# Patient Record
Sex: MALE | NOT HISPANIC OR LATINO | Employment: OTHER | URBAN - METROPOLITAN AREA
[De-identification: names, ages, dates, MRNs, and addresses within clinical notes are randomized per-mention and may not be internally consistent; named-entity substitution may affect disease eponyms.]

---

## 2023-09-19 PROBLEM — R20.2 TINGLING: Status: ACTIVE | Noted: 2023-09-19

## 2023-09-19 PROBLEM — I25.10 ARTERIOSCLEROSIS OF CORONARY ARTERY: Status: ACTIVE | Noted: 2023-09-19

## 2023-09-19 PROBLEM — E78.5 DYSLIPIDEMIA: Status: ACTIVE | Noted: 2023-09-19

## 2023-09-19 PROBLEM — I82.403: Status: ACTIVE | Noted: 2023-09-19

## 2023-09-19 PROBLEM — I26.99 PULMONARY EMBOLI (MULTI): Status: ACTIVE | Noted: 2023-09-19

## 2023-09-19 PROBLEM — R20.0 NUMBNESS: Status: ACTIVE | Noted: 2023-09-19

## 2023-09-19 RX ORDER — FINASTERIDE 5 MG/1
5 TABLET, FILM COATED ORAL DAILY
COMMUNITY
Start: 2022-05-14

## 2023-09-19 RX ORDER — ALLOPURINOL 300 MG/1
1 TABLET ORAL DAILY
COMMUNITY
Start: 2022-02-16

## 2023-09-19 RX ORDER — LEVOTHYROXINE SODIUM 175 UG/1
175 TABLET ORAL DAILY
COMMUNITY
Start: 2012-01-19

## 2023-09-19 RX ORDER — AMLODIPINE BESYLATE 5 MG/1
5 TABLET ORAL DAILY
COMMUNITY
Start: 2012-03-09

## 2023-09-19 RX ORDER — TAMSULOSIN HYDROCHLORIDE 0.4 MG/1
0.4 CAPSULE ORAL
COMMUNITY
Start: 2022-03-18

## 2023-09-19 RX ORDER — METFORMIN HYDROCHLORIDE 500 MG/1
1000 TABLET, EXTENDED RELEASE ORAL
COMMUNITY
Start: 2022-05-14

## 2023-09-19 RX ORDER — METOPROLOL TARTRATE 25 MG/1
25 TABLET, FILM COATED ORAL 2 TIMES DAILY
COMMUNITY

## 2023-09-19 RX ORDER — BLOOD SUGAR DIAGNOSTIC
STRIP MISCELLANEOUS
COMMUNITY
Start: 2021-11-10

## 2023-09-19 RX ORDER — OMEGA-3-ACID ETHYL ESTERS 1 G/1
1 CAPSULE, LIQUID FILLED ORAL DAILY
COMMUNITY
Start: 2021-05-22

## 2023-09-19 RX ORDER — GLIMEPIRIDE 4 MG/1
4 TABLET ORAL
COMMUNITY
Start: 2012-09-12

## 2023-11-29 ENCOUNTER — OFFICE VISIT (OUTPATIENT)
Dept: CARDIOLOGY | Facility: CLINIC | Age: 78
End: 2023-11-29
Payer: MEDICARE

## 2023-11-29 VITALS
BODY MASS INDEX: 33.47 KG/M2 | DIASTOLIC BLOOD PRESSURE: 79 MMHG | HEIGHT: 69 IN | SYSTOLIC BLOOD PRESSURE: 166 MMHG | HEART RATE: 77 BPM | WEIGHT: 226 LBS | RESPIRATION RATE: 16 BRPM

## 2023-11-29 DIAGNOSIS — Z86.718 HISTORY OF DEEP VEIN THROMBOSIS: ICD-10-CM

## 2023-11-29 DIAGNOSIS — M79.89 SWELLING OF LOWER EXTREMITY: ICD-10-CM

## 2023-11-29 DIAGNOSIS — I26.99 PULMONARY EMBOLISM, UNSPECIFIED CHRONICITY, UNSPECIFIED PULMONARY EMBOLISM TYPE, UNSPECIFIED WHETHER ACUTE COR PULMONALE PRESENT (MULTI): Primary | ICD-10-CM

## 2023-11-29 PROCEDURE — 1159F MED LIST DOCD IN RCRD: CPT | Performed by: INTERNAL MEDICINE

## 2023-11-29 PROCEDURE — 1036F TOBACCO NON-USER: CPT | Performed by: INTERNAL MEDICINE

## 2023-11-29 PROCEDURE — 99214 OFFICE O/P EST MOD 30 MIN: CPT | Performed by: INTERNAL MEDICINE

## 2023-11-29 PROCEDURE — 1126F AMNT PAIN NOTED NONE PRSNT: CPT | Performed by: INTERNAL MEDICINE

## 2023-11-29 PROCEDURE — 1160F RVW MEDS BY RX/DR IN RCRD: CPT | Performed by: INTERNAL MEDICINE

## 2023-11-29 RX ORDER — ASCORBIC ACID 500 MG
500 TABLET ORAL DAILY
COMMUNITY

## 2023-11-29 RX ORDER — ORAL SEMAGLUTIDE 3 MG/1
3 TABLET ORAL DAILY
COMMUNITY
Start: 2023-08-25 | End: 2023-11-29 | Stop reason: ALTCHOICE

## 2023-11-29 RX ORDER — VITAMIN E MIXED 400 UNIT
400 CAPSULE ORAL DAILY
COMMUNITY

## 2023-11-29 RX ORDER — MULTIVITAMIN/IRON/FOLIC ACID 18MG-0.4MG
1 TABLET ORAL DAILY
COMMUNITY

## 2023-11-29 RX ORDER — ATORVASTATIN CALCIUM 40 MG/1
40 TABLET, FILM COATED ORAL DAILY
COMMUNITY

## 2023-11-29 RX ORDER — ACETAMINOPHEN 500 MG
500 TABLET ORAL EVERY 6 HOURS PRN
COMMUNITY

## 2023-11-29 RX ORDER — TIZANIDINE 2 MG/1
2 TABLET ORAL EVERY 8 HOURS PRN
COMMUNITY

## 2023-11-29 RX ORDER — UBIDECARENONE 30 MG
30 CAPSULE ORAL DAILY
COMMUNITY

## 2023-11-29 RX ORDER — GEMFIBROZIL 600 MG/1
600 TABLET, FILM COATED ORAL 2 TIMES DAILY
COMMUNITY

## 2023-11-29 RX ORDER — ORAL SEMAGLUTIDE 7 MG/1
1 TABLET ORAL DAILY
COMMUNITY

## 2023-11-29 ASSESSMENT — COLUMBIA-SUICIDE SEVERITY RATING SCALE - C-SSRS
2. HAVE YOU ACTUALLY HAD ANY THOUGHTS OF KILLING YOURSELF?: NO
6. HAVE YOU EVER DONE ANYTHING, STARTED TO DO ANYTHING, OR PREPARED TO DO ANYTHING TO END YOUR LIFE?: NO
1. IN THE PAST MONTH, HAVE YOU WISHED YOU WERE DEAD OR WISHED YOU COULD GO TO SLEEP AND NOT WAKE UP?: NO

## 2023-11-29 ASSESSMENT — PATIENT HEALTH QUESTIONNAIRE - PHQ9
SUM OF ALL RESPONSES TO PHQ9 QUESTIONS 1 AND 2: 0
1. LITTLE INTEREST OR PLEASURE IN DOING THINGS: NOT AT ALL
2. FEELING DOWN, DEPRESSED OR HOPELESS: NOT AT ALL

## 2023-11-29 ASSESSMENT — PAIN SCALES - GENERAL: PAINLEVEL: 0-NO PAIN

## 2023-11-29 NOTE — PATIENT INSTRUCTIONS
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 Given his age, male gender, magnitude of the event and low risk trigger - anticipate secondary prophylaxis as long as the perceived benefits outweigh the risks. Use the med-alert - please order another if preferred.     Discussed adding tubigrip to the current stocking. Elevate at night.     Follow up 10 months.

## 2023-11-29 NOTE — PROGRESS NOTES
OUTPATIENT FOLLOW-UP -  VASCULAR MEDICINE    DOS: 11/29/23  Last seen:    7/25/23    REQUESTING PHYSICIAN:  Dr Charly Christopher D.O.     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 Reports doing well on the eliquis. No bleeding on the Ac. Had a med-alert but lost it - replaced today. Uses compression for quinton LE swelling ?15-20 mmHg - these do not feel very strong. Discussed adding tubigrip today - reports sometimes adds ACE to help with the swelling. Has quinton AFO and this is also likely contributing to the edema.     From prev notes:    AdmittedApril 10-14th 2022 due to submassive PE with RV strain and bilateral DVTs currently on eliquis 5mg BID. He initially presented to the ED with dizziness and an episode of LOC. Pt improved on heparin (transitioned to eliquis) without any other interventions. THE ONLY IDENTIFIED TRIGGER FOR THE VTE WAS A TRIP [place] OVER A 2 WEEK PERIOD.      May 12 2022 echo: EF 55-60%. RV RECOVERED.         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 Given his age, male gender, magnitude of the event and low risk trigger - anticipate secondary prophylaxis as long as the perceived benefits outweigh the risks. Use the  med-alert - please order another if preferred.     Discussed adding tubigrip to the current stocking. Elevate at night.     Follow up 10 months.

## 2025-02-05 ENCOUNTER — OFFICE VISIT (OUTPATIENT)
Dept: CARDIOLOGY | Facility: CLINIC | Age: 80
End: 2025-02-05
Payer: MEDICARE

## 2025-02-05 VITALS
BODY MASS INDEX: 33.46 KG/M2 | DIASTOLIC BLOOD PRESSURE: 93 MMHG | OXYGEN SATURATION: 94 % | HEART RATE: 79 BPM | HEIGHT: 70 IN | WEIGHT: 233.7 LBS | RESPIRATION RATE: 16 BRPM | SYSTOLIC BLOOD PRESSURE: 157 MMHG

## 2025-02-05 DIAGNOSIS — I26.99 PULMONARY EMBOLISM, UNSPECIFIED CHRONICITY, UNSPECIFIED PULMONARY EMBOLISM TYPE, UNSPECIFIED WHETHER ACUTE COR PULMONALE PRESENT (MULTI): Primary | ICD-10-CM

## 2025-02-05 DIAGNOSIS — Z86.718 HISTORY OF DEEP VEIN THROMBOSIS: ICD-10-CM

## 2025-02-05 PROCEDURE — 1159F MED LIST DOCD IN RCRD: CPT | Performed by: INTERNAL MEDICINE

## 2025-02-05 PROCEDURE — 99214 OFFICE O/P EST MOD 30 MIN: CPT | Performed by: INTERNAL MEDICINE

## 2025-02-05 PROCEDURE — 1160F RVW MEDS BY RX/DR IN RCRD: CPT | Performed by: INTERNAL MEDICINE

## 2025-02-05 ASSESSMENT — COLUMBIA-SUICIDE SEVERITY RATING SCALE - C-SSRS
1. IN THE PAST MONTH, HAVE YOU WISHED YOU WERE DEAD OR WISHED YOU COULD GO TO SLEEP AND NOT WAKE UP?: NO
6. HAVE YOU EVER DONE ANYTHING, STARTED TO DO ANYTHING, OR PREPARED TO DO ANYTHING TO END YOUR LIFE?: NO
2. HAVE YOU ACTUALLY HAD ANY THOUGHTS OF KILLING YOURSELF?: NO

## 2025-02-05 NOTE — PROGRESS NOTES
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 Doing well. No bleeding on the eliquis. +bruising. Has a med-alert - not using.      Reports had a fall in July - denies head trauma. Did seek medical attention. No bleeding. Had a shoulder injury at that time. Now pending surgery at [facility] - reversed total shoulder replacement. This is planned for 2/21.    Uses compression for shazia LE swelling - has AFO shazia for Charcot. Ankle fusion left. Reports the tubigrip was not strong enough to help the swelling - using an ankle brace and this helps.     From prev notes:     AdmittedApril 10-14th 2022 due to submassive PE with RV strain and bilateral DVTs currently on eliquis 5mg BID. He initially presented to the ED with dizziness and an episode of LOC. Pt improved on heparin (transitioned to eliquis) without any other interventions. THE ONLY IDENTIFIED TRIGGER FOR THE VTE WAS A TRIP [place] OVER A 2 WEEK PERIOD.      May 12 2022 echo: EF 55-60%. RV RECOVERED.         REVIEW OF SYSTEMS:     weight is stable  No sores, ulcers, rashes, skin lesions  No CP, chest pressure  No cough, SOB  No BRBPR, melena, hematuria  No bleeding  + edema, no calf pain    PHYSICAL EXAMINATION:   BP (!) 157/93 (BP Location: Right arm, Patient Position: Sitting)   Pulse 79   Resp 16   Ht 1.778 m (5' 10\")   Wt 106 kg (233 lb 11.2 oz)   SpO2 94%   BMI 33.53 kg/m²     Gen: Appears well, NAD  Chest: CTA  CVS: regular without murmur or gallop  Ext: trace edema, nontender  Skin: good condition without wounds or lesions  Pulses: WWP  Mood and affect appropriate    ADDITIONAL DATA: 20-30 mmHg compression " stockings LISA. Right calf: 35.5 CM  Left calf: 32.5 CM .    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 Discussed continued AC as long as the benefits outweigh risks given his age, male gender, magnitude of the event and low risk trigger. Use the med-alert - please order another if preferred.     OK to hold the eliquis 3 days pre-op - resume post-op when approved by the surgeon.     Follow up annually.

## 2025-02-05 NOTE — PATIENT INSTRUCTIONS
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 Discussed continued AC as long as the benefits outweigh risks given his age, male gender, magnitude of the event and low risk trigger. Use the med-alert - please order another if preferred.     OK to hold the eliquis 3 days pre-op - resume post-op when approved by the surgeon.     Follow up annually.